# Patient Record
Sex: FEMALE | Race: WHITE | Employment: PART TIME | URBAN - METROPOLITAN AREA
[De-identification: names, ages, dates, MRNs, and addresses within clinical notes are randomized per-mention and may not be internally consistent; named-entity substitution may affect disease eponyms.]

---

## 2017-07-12 ENCOUNTER — HOSPITAL ENCOUNTER (OUTPATIENT)
Age: 37
Setting detail: OBSERVATION
Discharge: HOME | End: 2017-07-13
Attending: EMERGENCY MEDICINE | Admitting: HOSPITALIST
Payer: MEDICAID

## 2017-07-12 ENCOUNTER — APPOINTMENT (OUTPATIENT)
Dept: CT IMAGING | Age: 37
End: 2017-07-12
Attending: PHYSICIAN ASSISTANT
Payer: MEDICAID

## 2017-07-12 DIAGNOSIS — S09.90XA HEAD TRAUMA, INITIAL ENCOUNTER: Primary | ICD-10-CM

## 2017-07-12 DIAGNOSIS — G40.A09 NONINTRACTABLE ABSENCE EPILEPSY WITHOUT STATUS EPILEPTICUS (HCC): ICD-10-CM
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
## 2017-07-12 DIAGNOSIS — S06.2X9A: ICD-10-CM

## 2017-07-12 DIAGNOSIS — S00.81XA FACIAL ABRASION, INITIAL ENCOUNTER: ICD-10-CM

## 2017-07-12 DIAGNOSIS — S09.90XA CHI (CLOSED HEAD INJURY), INITIAL ENCOUNTER: ICD-10-CM

## 2017-07-12 LAB
ALBUMIN SERPL BCP-MCNC: 3.5 G/DL (ref 3.4–5)
ALBUMIN/GLOB SERPL: 1 {RATIO} (ref 0.8–1.7)
ALP SERPL-CCNC: 137 U/L (ref 45–117)
ALT SERPL-CCNC: 15 U/L (ref 13–56)
AMPHET UR QL SCN: NEGATIVE
ANION GAP BLD CALC-SCNC: 12 MMOL/L (ref 3–18)
APPEARANCE UR: CLEAR
APTT PPP: 37.5 SEC (ref 23–36.4)
AST SERPL W P-5'-P-CCNC: 22 U/L (ref 15–37)
BARBITURATES UR QL SCN: NEGATIVE
BASOPHILS # BLD AUTO: 0 K/UL (ref 0–0.1)
BASOPHILS # BLD: 0 % (ref 0–3)
BENZODIAZ UR QL: NEGATIVE
BILIRUB SERPL-MCNC: 0.4 MG/DL (ref 0.2–1)
BILIRUB UR QL: NEGATIVE
BUN SERPL-MCNC: 9 MG/DL (ref 7–18)
BUN/CREAT SERPL: 13 (ref 12–20)
CALCIUM SERPL-MCNC: 8.6 MG/DL (ref 8.5–10.1)
CANNABINOIDS UR QL SCN: NEGATIVE
CHLORIDE SERPL-SCNC: 106 MMOL/L (ref 100–108)
CK MB CFR SERPL CALC: NORMAL % (ref 0–4)
CK MB SERPL-MCNC: <1 NG/ML (ref 5–25)
CK SERPL-CCNC: 101 U/L (ref 26–192)
CO2 SERPL-SCNC: 22 MMOL/L (ref 21–32)
COCAINE UR QL SCN: NEGATIVE
COLOR UR: YELLOW
CREAT SERPL-MCNC: 0.7 MG/DL (ref 0.6–1.3)
D DIMER PPP FEU-MCNC: 0.31 UG/ML(FEU)
DIFFERENTIAL METHOD BLD: ABNORMAL
EOSINOPHIL # BLD: 0.2 K/UL (ref 0–0.4)
EOSINOPHIL NFR BLD: 2 % (ref 0–5)
ERYTHROCYTE [DISTWIDTH] IN BLOOD BY AUTOMATED COUNT: 13 % (ref 11.6–14.5)
GLOBULIN SER CALC-MCNC: 3.4 G/DL (ref 2–4)
GLUCOSE SERPL-MCNC: 123 MG/DL (ref 74–99)
GLUCOSE UR STRIP.AUTO-MCNC: NEGATIVE MG/DL
HCG UR QL: NEGATIVE
HCT VFR BLD AUTO: 38.5 % (ref 35–45)
HDSCOM,HDSCOM: NORMAL
HGB BLD-MCNC: 13.3 G/DL (ref 12–16)
HGB UR QL STRIP: NEGATIVE
INR PPP: 1 (ref 0.8–1.2)
KETONES UR QL STRIP.AUTO: NEGATIVE MG/DL
LEUKOCYTE ESTERASE UR QL STRIP.AUTO: NEGATIVE
LYMPHOCYTES # BLD AUTO: 32 % (ref 20–51)
LYMPHOCYTES # BLD: 3.6 K/UL (ref 0.8–3.5)
MCH RBC QN AUTO: 30.2 PG (ref 24–34)
MCHC RBC AUTO-ENTMCNC: 34.5 G/DL (ref 31–37)
MCV RBC AUTO: 87.3 FL (ref 74–97)
METHADONE UR QL: NEGATIVE
MONOCYTES # BLD: 1 K/UL (ref 0–1)
MONOCYTES NFR BLD AUTO: 9 % (ref 2–9)
NEUTS SEG # BLD: 6.3 K/UL (ref 1.8–8)
NEUTS SEG NFR BLD AUTO: 57 % (ref 42–75)
NITRITE UR QL STRIP.AUTO: NEGATIVE
OPIATES UR QL: NEGATIVE
PCP UR QL: NEGATIVE
PH UR STRIP: 6 [PH] (ref 5–8)
PLATELET # BLD AUTO: 244 K/UL (ref 135–420)
PLATELET COMMENTS,PCOM: ABNORMAL
PMV BLD AUTO: 12.6 FL (ref 9.2–11.8)
POTASSIUM SERPL-SCNC: 3.7 MMOL/L (ref 3.5–5.5)
PROT SERPL-MCNC: 6.9 G/DL (ref 6.4–8.2)
PROT UR STRIP-MCNC: NEGATIVE MG/DL
PROTHROMBIN TIME: 12.7 SEC (ref 11.5–15.2)
RBC # BLD AUTO: 4.41 M/UL (ref 4.2–5.3)
RBC MORPH BLD: ABNORMAL
SODIUM SERPL-SCNC: 140 MMOL/L (ref 136–145)
SP GR UR REFRACTOMETRY: 1.01 (ref 1–1.03)
TROPONIN I SERPL-MCNC: <0.02 NG/ML (ref 0–0.06)
UROBILINOGEN UR QL STRIP.AUTO: 0.2 EU/DL (ref 0.2–1)
WBC # BLD AUTO: 11.1 K/UL (ref 4.6–13.2)
WBC MORPH BLD: ABNORMAL

## 2017-07-12 PROCEDURE — 96360 HYDRATION IV INFUSION INIT: CPT

## 2017-07-12 PROCEDURE — 96361 HYDRATE IV INFUSION ADD-ON: CPT

## 2017-07-12 PROCEDURE — 81025 URINE PREGNANCY TEST: CPT

## 2017-07-12 PROCEDURE — 74011250636 HC RX REV CODE- 250/636: Performed by: PHYSICIAN ASSISTANT

## 2017-07-12 PROCEDURE — 99285 EMERGENCY DEPT VISIT HI MDM: CPT

## 2017-07-12 PROCEDURE — 70450 CT HEAD/BRAIN W/O DYE: CPT

## 2017-07-12 PROCEDURE — 93005 ELECTROCARDIOGRAM TRACING: CPT

## 2017-07-12 PROCEDURE — 99218 HC RM OBSERVATION: CPT

## 2017-07-12 PROCEDURE — 80307 DRUG TEST PRSMV CHEM ANLYZR: CPT | Performed by: PHYSICIAN ASSISTANT

## 2017-07-12 PROCEDURE — 74011250637 HC RX REV CODE- 250/637: Performed by: PHYSICIAN ASSISTANT

## 2017-07-12 PROCEDURE — 74011250637 HC RX REV CODE- 250/637: Performed by: HOSPITALIST

## 2017-07-12 PROCEDURE — 80053 COMPREHEN METABOLIC PANEL: CPT | Performed by: PHYSICIAN ASSISTANT

## 2017-07-12 PROCEDURE — 85025 COMPLETE CBC W/AUTO DIFF WBC: CPT | Performed by: FAMILY MEDICINE

## 2017-07-12 PROCEDURE — 80177 DRUG SCRN QUAN LEVETIRACETAM: CPT | Performed by: PHYSICIAN ASSISTANT

## 2017-07-12 PROCEDURE — 81003 URINALYSIS AUTO W/O SCOPE: CPT | Performed by: PHYSICIAN ASSISTANT

## 2017-07-12 PROCEDURE — 70486 CT MAXILLOFACIAL W/O DYE: CPT

## 2017-07-12 PROCEDURE — 85379 FIBRIN DEGRADATION QUANT: CPT | Performed by: PHYSICIAN ASSISTANT

## 2017-07-12 PROCEDURE — 85610 PROTHROMBIN TIME: CPT | Performed by: PHYSICIAN ASSISTANT

## 2017-07-12 PROCEDURE — 82550 ASSAY OF CK (CPK): CPT | Performed by: PHYSICIAN ASSISTANT

## 2017-07-12 PROCEDURE — 85730 THROMBOPLASTIN TIME PARTIAL: CPT | Performed by: PHYSICIAN ASSISTANT

## 2017-07-12 RX ORDER — ONDANSETRON 2 MG/ML
4 INJECTION INTRAMUSCULAR; INTRAVENOUS
Status: DISCONTINUED | OUTPATIENT
Start: 2017-07-12 | End: 2017-07-13 | Stop reason: HOSPADM

## 2017-07-12 RX ORDER — LEVETIRACETAM 500 MG/1
1500 TABLET ORAL
Status: COMPLETED | OUTPATIENT
Start: 2017-07-12 | End: 2017-07-12

## 2017-07-12 RX ORDER — ACETAMINOPHEN 325 MG/1
650 TABLET ORAL
Status: DISCONTINUED | OUTPATIENT
Start: 2017-07-12 | End: 2017-07-13 | Stop reason: HOSPADM

## 2017-07-12 RX ORDER — LEVETIRACETAM 250 MG/1
1000 TABLET ORAL 2 TIMES DAILY
Status: DISCONTINUED | OUTPATIENT
Start: 2017-07-13 | End: 2017-07-12

## 2017-07-12 RX ORDER — LEVETIRACETAM 500 MG/1
1500 TABLET ORAL 2 TIMES DAILY
Status: DISCONTINUED | OUTPATIENT
Start: 2017-07-13 | End: 2017-07-13 | Stop reason: HOSPADM

## 2017-07-12 RX ORDER — SODIUM CHLORIDE 9 MG/ML
75 INJECTION, SOLUTION INTRAVENOUS CONTINUOUS
Status: DISCONTINUED | OUTPATIENT
Start: 2017-07-12 | End: 2017-07-13

## 2017-07-12 RX ADMIN — ACETAMINOPHEN 650 MG: 325 TABLET ORAL at 22:05

## 2017-07-12 RX ADMIN — LEVETIRACETAM 1500 MG: 500 TABLET, FILM COATED ORAL at 21:29

## 2017-07-12 RX ADMIN — SODIUM CHLORIDE 1000 ML: 900 INJECTION, SOLUTION INTRAVENOUS at 19:00
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
## 2017-07-12 NOTE — ED NOTES
Report received. Care of pt assumed at this time. Pt resting in position of comfort. Awaiting CT scan. Family at bedside. Will continue to monitor. No further needs expressed at this time.

## 2017-07-12 NOTE — IP AVS SNAPSHOT
Joy Riley 
 
 
 [address] [place] [address] 
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 BMI OB Status Smoking Status 1.626 m 77.1 kg No 29.18 kg/m2 Having regular periods Former Smoker Unresulted Labs Order Current Status LEVETIRACETAM (KEPPRA) In process Emergency Contacts Name Discharge Info Relation Home Work Mobile Climmie Batters  Parent [1] [phone] About your hospitalization You were admitted on:  July 12, 2017 You last received care in the:  [address] [place] [address] You were discharged on:  July 13, 2017 Unit phone number:  [phone] Why you were hospitalized Your primary diagnosis was:  Acute Head Trauma Your diagnoses also included:  Absence Seizure Disorder (Hcc), Seizure Disorder (Hcc) Providers Seen During Your Hospitalizations Provider Role Specialty Primary office phone Chirag Valdes MD Attending Provider Emergency Medicine [phone] Samreen Fairchild MD Attending Provider Emergency Medicine [phone] Deana Andrade MD Attending Provider [facility] [phone] Jimena Martinez DO Attending Provider Internal Medicine [phone] Your Primary Care Physician (PCP) Primary Care Physician Office Phone Office Fax Skillman Marcus [phone] Follow-up Information Follow up With Details Comments Contact Info Quentin Mae MD   [address] 
[phone] Lisa Mccormick MD  or your neurologist C/ Anjel Mckeon [address] [place] [address] 
[phone] Ynes Jones MD   18[address] [facility] [address] 
 [address] [address] 
[phone] Current Discharge Medication List  
  
CONTINUE these medications which have CHANGED Dose & Instructions Dispensing Information Comments Morning Noon Evening Bedtime  
 levETIRAcetam 750 mg tablet Commonly known as:  KEPPRA What changed:   
- medication strength 
- how much to take Your last dose was: Your next dose is:    
   
   
 Dose:  1500 mg Take 2 Tabs by mouth two (2) times a day. Quantity:  180 Tab Refills:  1 CONTINUE these medications which have NOT CHANGED Dose & Instructions Dispensing Information Comments Morning Noon Evening Bedtime  
 cyanocobalamin 1,000 mcg tablet Your last dose was: Your next dose is:    
   
   
 Dose:  1000 mcg Take 1,000 mcg by mouth daily. Refills:  0  
     
   
   
   
  
 multivitamin tablet Commonly known as:  ONE A DAY Your last dose was: Your next dose is:    
   
   
 Dose:  1 Tab Take 1 tablet by mouth daily. Refills:  0 Where to Get Your Medications Information on where to get these meds will be given to you by the nurse or doctor. ! Ask your nurse or doctor about these medications  
  levETIRAcetam 750 mg tablet Discharge Instructions   
  
epilepsy precautions including no driving for six months!! Epilepsy: Care Instructions Your Care Instructions Epilepsy is a common condition that causes repeated seizures. The seizures are caused by bursts of electrical activity in the brain that aren't normal. Seizures may cause problems with muscle control, movement, speech, vision, or awareness. They can be scary. Epilepsy affects each person differently. Some people have only a few seizures. Others get them more often. If you know what triggers a seizure, you may be able to avoid having one. You can take medicines to control and reduce seizures. You and your doctor will need to find the right combination, schedule, and dose of medicine. This may take time and careful changes. Seizures may get worse and happen more often over time. Follow-up care is a key part of your treatment and safety. Be sure to make and go to all appointments, and call your doctor if you are having problems. It's also a good idea to know your test results and keep a list of the medicines you take. How can you care for yourself at home? · Be safe with medicines. Take your medicines exactly as prescribed. Call your doctor if you think you are having a problem with your medicine. · Make a treatment plan with your doctor. Be sure to follow your plan. · Try to identify and avoid things that may make you more likely to have a seizure. These may include: ¨ Not getting enough sleep. ¨ Using drugs or alcohol. ¨ Being emotionally stressed. ¨ Skipping meals. · Keep a record of any seizures you have. Note the date, time of day, and any details about the seizure that you can remember. Your doctor can use this information to plan or adjust your medicine or other treatment. · Be sure that any doctor treating you for another condition knows that you have epilepsy. Each doctor should know what medicines you are taking, if any. · Wear a medical ID bracelet. You can buy this at most drugstores. If you have a seizure that leaves you unconscious or unable to speak for yourself, this bracelet will let those who are treating you know that you have epilepsy. · Talk to your doctor about whether it is safe for you to do certain activities, such as drive or swim. When should you call for help? Call 911 anytime you think you may need emergency care. For example, call if: · A seizure does not stop as it normally does. · You have new symptoms such as: 
¨ Numbness, tingling, or weakness on one side of your body or face. ¨ Vision changes. ¨ Trouble speaking or thinking clearly. Call your doctor now or seek immediate medical care if: 
· You have a fever. · You have a severe headache. Watch closely for changes in your health, and be sure to contact your doctor if: · The normal pattern or features of your seizures change. Where can you learn more? Go to http://gaurang-shira.info/. [employer] in the search box to learn more about \"Epilepsy: Care Instructions. \" Current as of: October 14, 2016 Content Version: 11.3 © 3127-7358 [employer]. Care instructions adapted under license by [employer] (which disclaims liability or warranty for this information). If you have questions about a medical condition or this instruction, always ask your healthcare professional. [address] any warranty or liability for your use of this information. DISCHARGE SUMMARY from Nurse The following personal items are in your possession at time of discharge: 
 
Dental Appliances: None Visual Aid: Glasses, With patient Home Medications: None Jewelry: Ring, Earrings (in purse) Clothing: Shirt Other Valuables: None PATIENT INSTRUCTIONS: 
 
 
F-face looks uneven A-arms unable to move or move unevenly S-speech slurred or non-existent T-time-call 911 as soon as signs and symptoms begin-DO NOT go Back to bed or wait to see if you get better-TIME IS BRAIN. Warning Signs of HEART ATTACK Call 911 if you have these symptoms: 
? Chest discomfort. Most heart attacks involve discomfort in the center of the chest that lasts more than a few minutes, or that goes away and comes back. It can feel like uncomfortable pressure, squeezing, fullness, or pain. ? Discomfort in other areas of the upper body.  Symptoms can include pain or discomfort in one or both arms, the back, neck, jaw, or stomach. ? Shortness of breath with or without chest discomfort. ? Other signs may include breaking out in a cold sweat, nausea, or lightheadedness. Don't wait more than five minutes to call [address]! Fast action can save your life. Calling 911 is almost always the fastest way to get lifesaving treatment. Emergency Medical Services staff can begin treatment when they arrive  up to an hour sooner than if someone gets to the hospital by car. The discharge information has been reviewed with the patient. The patient verbalized understanding. Discharge medications reviewed with the patient and appropriate educational materials and side effects teaching were provided. Patient armband removed and shredded Discharge Orders None [employer] Announcement We are excited to announce that we are making your provider's discharge notes available to you in Oxtox. You will see these notes when they are completed and signed by the physician that discharged you from your recent hospital stay. If you have any questions or concerns about any information you see in Oxtox, please call the Health Information Department where you were seen or reach out to your Primary Care Provider for more information about your plan of care. Introducing \[facility]\"" & HEALTH SERVICES! [employer] introduces [employer]xtox patient portal. Now you can access parts of your medical record, email your doctor's office, and request medication refills online. 1. In your internet browser, go to [email or web address] 2. Click on the First Time User? Click Here link in the Sign In box. You will see the New Member Sign Up page. 3. Enter your Oxtox Access Code exactly as it appears below. You will not need to use this code after youve completed the sign-up process. If you do not sign up before the expiration date, you must request a new code. · [employer] Access Code: [email or web address] Expires: 10/11/2017  8:59 AM 
 
4. Enter the last four digits of your Social Security Number (xxxx) and Date of Birth (mm/dd/yyyy) as indicated and click Submit. You will be taken to the next sign-up page. 5. Create a [employer] ID. This will be your [employer] login ID and cannot be changed, so think of one that is secure and easy to remember. 6. Create a [employer] password. You can change your password at any time. 7. Enter your Password Reset Question and Answer. This can be used at a later time if you forget your password. 8. Enter your e-mail address. You will receive e-mail notification when new information is available in [address] 9. Click Sign Up. You can now view and download portions of your medical record. 10. Click the Download Summary menu link to download a portable copy of your medical information. If you have questions, please visit the Frequently Asked Questions section of the [employer] website. Remember, [employer] is NOT to be used for urgent needs. For medical emergencies, dial 911. Now available from your iPhone and Android! General Information Please provide this summary of care documentation to your next provider. Patient Signature:  ____________________________________________________________ Date:  ____________________________________________________________  
  
Ilia Barrios Provider Signature:  ____________________________________________________________ Date:  ____________________________________________________________

## 2017-07-12 NOTE — IP AVS SNAPSHOT
Summary of Care Report The Summary of Care report has been created to help improve care coordination. Users with access to [employer] or [address] (Web-based application) may access additional patient information including the Discharge Summary. If you are not currently a [address] user and need more information, please call the number listed below in the [place] [address] section and ask to be connected with Medical Records. Facility Information Name Address Phone [address] [place] [id number] [phone] Patient Information Patient Name Sex DANG Mathews ([id number]) Female 1980 Discharge Information Admitting Provider Service Area Unit Essence Tucker MD / [phone] [facility] 3 [address] [facility]/Med / [phone] Discharge Provider Discharge Date/Time Discharge Disposition Destination (none) 2017 Midday (Pending) AHR (none) Patient Language Language ENGLISH [13] Hospital Problems as of 2017  Reviewed: 2017  9:37 PM by Essence Tucker MD  
  
  
  
 Class Noted - Resolved Last Modified POA Active Problems Seizure disorder ([facility]  2011 - Present 2017 by Essence Tucker MD Yes Entered by Donna Lin Absence seizure disorder ([facility]  2017 - Present 2017 by Essence Tucker MD Yes Entered by Lisy Ignacio PA-C  
  * (Principal)Acute head trauma  2017 - Present 2017 by Essence Tucker MD Unknown Entered by Lisy Ignacio PA-C Non-Hospital Problems as of 2017  Reviewed: 2017  9:37 PM by Essence Tucker MD  
  
  
  
 Class Noted - Resolved Last Modified Active Problems Closed right ankle fracture  12/10/2014 - Present 2014 Entered by Amrit Horner PA-C You are allergic to the following Allergen Reactions Aspirin Nausea Only Had leukemia when she was 1 - was told by mother not to take aspirin Codeine Hives Current Discharge Medication List  
  
CONTINUE these medications which have CHANGED Dose & Instructions Dispensing Information Comments  
 levETIRAcetam 750 mg tablet Commonly known as:  KEPPRA What changed:   
- medication strength 
- how much to take Dose:  1500 mg Take 2 Tabs by mouth two (2) times a day. Quantity:  180 Tab Refills:  1 CONTINUE these medications which have NOT CHANGED Dose & Instructions Dispensing Information Comments  
 cyanocobalamin 1,000 mcg tablet Dose:  1000 mcg Take 1,000 mcg by mouth daily. Refills:  0  
   
 multivitamin tablet Commonly known as:  ONE A DAY Dose:  1 Tab Take 1 tablet by mouth daily. Refills:  0 Current Immunizations Name Date Influenza Vaccine 9/12/2014 Influenza Vaccine Split 11/30/2011 Tdap 5/3/2013 Follow-up Information Follow up With Details Comments Contact Info Jaquelin Echevarria MD   [address] 
[phone] Shayna Ivy MD  or your neurologist C/ Anjel Mckeon  [address] [facility] 
[phone] Carlos Chanel MD   14[address] [facility] [address] [facility] [id number] [phone] Discharge Instructions   
  
epilepsy precautions including no driving for six months!! Epilepsy: Care Instructions Your Care Instructions Epilepsy is a common condition that causes repeated seizures. The seizures are caused by bursts of electrical activity in the brain that aren't normal. Seizures may cause problems with muscle control, movement, speech, vision, or awareness. They can be scary. Epilepsy affects each person differently. Some people have only a few seizures. Others get them more often. If you know what triggers a seizure, you may be able to avoid having one. You can take medicines to control and reduce seizures. You and your doctor will need to find the right combination, schedule, and dose of medicine. This may take time and careful changes. Seizures may get worse and happen more often over time. Follow-up care is a key part of your treatment and safety. Be sure to make and go to all appointments, and call your doctor if you are having problems. It's also a good idea to know your test results and keep a list of the medicines you take. How can you care for yourself at home? · Be safe with medicines. Take your medicines exactly as prescribed. Call your doctor if you think you are having a problem with your medicine. · Make a treatment plan with your doctor. Be sure to follow your plan. · Try to identify and avoid things that may make you more likely to have a seizure. These may include: ¨ Not getting enough sleep. ¨ Using drugs or alcohol. ¨ Being emotionally stressed. ¨ Skipping meals. · Keep a record of any seizures you have. Note the date, time of day, and any details about the seizure that you can remember. Your doctor can use this information to plan or adjust your medicine or other treatment. · Be sure that any doctor treating you for another condition knows that you have epilepsy. Each doctor should know what medicines you are taking, if any. · Wear a medical ID bracelet. You can buy this at most drugstores. If you have a seizure that leaves you unconscious or unable to speak for yourself, this bracelet will let those who are treating you know that you have epilepsy. · Talk to your doctor about whether it is safe for you to do certain activities, such as drive or swim. When should you call for help? Call 911 anytime you think you may need emergency care. For example, call if: · A seizure does not stop as it normally does. · You have new symptoms such as: 
¨ Numbness, tingling, or weakness on one side of your body or face. ¨ Vision changes. ¨ Trouble speaking or thinking clearly. Call your doctor now or seek immediate medical care if: 
· You have a fever. · You have a severe headache. Watch closely for changes in your health, and be sure to contact your doctor if: · The normal pattern or features of your seizures change. Where can you learn more? Go to http://gaurang-shira.info/. Tiera Jensen in the search box to learn more about \"Epilepsy: Care Instructions. \" Current as of: October 14, 2016 Content Version: 11.3 © 5148-0389 [employer]. Care instructions adapted under license by [employer] (which disclaims liability or warranty for this information). If you have questions about a medical condition or this instruction, always ask your healthcare professional. [place] [address] any warranty or liability for your use of this information. DISCHARGE SUMMARY from Nurse The following personal items are in your possession at time of discharge: 
 
Dental Appliances: None Visual Aid: Glasses, With patient Home Medications: None Jewelry: Ring, Earrings (in purse) Clothing: Shirt Other Valuables: None PATIENT INSTRUCTIONS: 
 
 
F-face looks uneven A-arms unable to move or move unevenly S-speech slurred or non-existent T-time-call 911 as soon as signs and symptoms begin-DO NOT go Back to bed or wait to see if you get better-TIME IS BRAIN. Warning Signs of HEART ATTACK Call 911 if you have these symptoms: 
? Chest discomfort. Most heart attacks involve discomfort in the center of the chest that lasts more than a few minutes, or that goes away and comes back. It can feel like uncomfortable pressure, squeezing, fullness, or pain. ? Discomfort in other areas of the upper body.  Symptoms can include pain or discomfort in one or both arms, the back, neck, jaw, or stomach. ? Shortness of breath with or without chest discomfort. ? Other signs may include breaking out in a cold sweat, nausea, or lightheadedness. Don't wait more than five minutes to call [address]! Fast action can save your life. Calling 911 is almost always the fastest way to get lifesaving treatment. Emergency Medical Services staff can begin treatment when they arrive  up to an hour sooner than if someone gets to the hospital by car. The discharge information has been reviewed with the patient. The patient verbalized understanding. Discharge medications reviewed with the patient and appropriate educational materials and side effects teaching were provided. Patient armband removed and shredded Chart Review Routing History Recipient Method Report Sent By Joy Salmeron MD  
Phone: [phone] In Basket IP Auto Routed Benjamin Humphrey MD [[id number]] 12/12/2014  6:04 PM 12/12/2014

## 2017-07-13 ENCOUNTER — APPOINTMENT (OUTPATIENT)
Dept: MRI IMAGING | Age: 37
End: 2017-07-13
Attending: HOSPITALIST
Payer: MEDICAID

## 2017-07-13 VITALS
RESPIRATION RATE: 18 BRPM | HEIGHT: 64 IN | HEART RATE: 71 BPM | SYSTOLIC BLOOD PRESSURE: 113 MMHG | WEIGHT: 169.97 LBS | DIASTOLIC BLOOD PRESSURE: 79 MMHG | TEMPERATURE: 98.1 F | OXYGEN SATURATION: 98 % | BODY MASS INDEX: 29.02 KG/M2

## 2017-07-13 PROCEDURE — 74011250637 HC RX REV CODE- 250/637: Performed by: HOSPITALIST

## 2017-07-13 PROCEDURE — 70553 MRI BRAIN STEM W/O & W/DYE: CPT

## 2017-07-13 PROCEDURE — 99218 HC RM OBSERVATION: CPT

## 2017-07-13 PROCEDURE — 95816 EEG AWAKE AND DROWSY: CPT | Performed by: PHYSICIAN ASSISTANT

## 2017-07-13 PROCEDURE — A9585 GADOBUTROL INJECTION: HCPCS | Performed by: INTERNAL MEDICINE

## 2017-07-13 PROCEDURE — 74011250636 HC RX REV CODE- 250/636: Performed by: INTERNAL MEDICINE

## 2017-07-13 RX ORDER — LEVETIRACETAM 750 MG/1
1500 TABLET ORAL 2 TIMES DAILY
Qty: 180 TAB | Refills: 1 | Status: SHIPPED | OUTPATIENT
Start: 2017-07-13

## 2017-07-13 RX ORDER — LANOLIN ALCOHOL/MO/W.PET/CERES
1000 CREAM (GRAM) TOPICAL DAILY
Status: DISCONTINUED | OUTPATIENT
Start: 2017-07-13 | End: 2017-07-13 | Stop reason: HOSPADM

## 2017-07-13 RX ADMIN — MULTIPLE VITAMINS W/ MINERALS TAB 1 TABLET: TAB at 10:48

## 2017-07-13 RX ADMIN — CYANOCOBALAMIN TAB 500 MCG 1000 MCG: 500 TAB at 10:47

## 2017-07-13 RX ADMIN — GADOBUTROL 7.5 ML: 604.72 INJECTION INTRAVENOUS at 09:19

## 2017-07-13 RX ADMIN — LEVETIRACETAM 1500 MG: 500 TABLET, FILM COATED ORAL at 10:48

## 2017-07-13 NOTE — PROGRESS NOTES
Dual AVS reviewed with JOHN Hauser RN. All medications reviewed individually with patient. Opportunities for questions and concerns provided. Patient discharged via (mode of transport ie. Car, ambulance or air transport) car  Patient's arm band appropriately discarded.

## 2017-07-13 NOTE — PROGRESS NOTES
Chart review    Readmission Risk Assessment: Low Risk and MSSP/Good Help ACO patients    RRAT Score: 1 - 12    Initial Assessment:Christie Hodge is a 42 y.o. Female with Hx of absence seizures, remote hx of leukemia in [address] [place] [address], presenting to the ED via EMS S/P a syncopal episode/ seizure that occurred ~1 hour PTA patient admitted for acute head trauma    Emergency Contact: Ramirez Ivan [phone]    Pertinent Medical Hx:  Seizures, radiation    PCP/Specialists: [employer]:     DME:     Low Risk Care Transition Plan:  1. Evaluate for [facility] or [address] coordination of resources  2. Involve patient/caregiver in assessment, planning, education and implement of  3. Care Management Interventions  4. Transition of Care Consult (CM Consult): Discharge Planning intervention. 5. CM daily patient care huddles/interdisciplinary rounds. 6. PCP/Specialist appointment within 7 - 10 days made prior to discharge. 7. Facilitate transportation and logistics for follow-up appointments. 8. Handoff to [address] [place] [address] Ne or PCP practice    Care manager available and will assist with safe transition of care.

## 2017-07-13 NOTE — ED NOTES
Pt back from CT. ED tech at bedside collecting required lab tubes. Pt resting in position of comfort. Family remains at bedside. Will continue to monitor while awaiting results.
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
## 2017-07-13 NOTE — PROGRESS NOTES
conducted an initial consultation and Spiritual Assessment for Marlen Valdes, who is a 42 y.o.,female. Patients Primary Language is: [place]. According to the patients EMR Sikh Affiliation is: No Restoration. The reason the Patient came to the hospital is:   Patient Active Problem List    Diagnosis Date Noted    Absence seizure disorder ([facility] 07/12/2017    Acute head trauma 07/12/2017    Closed right ankle fracture 12/10/2014    Seizure disorder ([facility] 11/30/2011        The  provided the following Interventions:  Initiated a relationship of care and support. Explored issues of karyn, belief, spirituality and Jainism/ritual needs while hospitalized. Listened empathically. Provided chaplaincy education. Provided information about Spiritual Care Services. Offered prayer and assurance of continued prayers on patients behalf. Chart reviewed. The following outcomes were achieved:  Patient shared limited information about both their medical narrative and spiritual journey/beliefs. Patient processed feeling about current hospitalization. Patient expressed gratitude for pastoral care visit. Assessment:  Patient does not have any Jainism/cultural needs that will affect patients preferences in health care. There are no further spiritual or Jainism issues which require intervention at this time. Plan:  Chaplains will continue to follow and will provide pastoral care on an as needed/requested basis.  recommends bedside caregivers page  on duty if patient shows signs of acute spiritual or emotional distress.       Sister Nany [facility], [place], [address]  [phone]

## 2017-07-13 NOTE — PROGRESS NOTES
D/c plan home today    Patient lives with her father in a hotel. Patient reports she has PCP and has medicaid for medical purposes. States she gets her medications under the medicaid plan. Patient denies any needs with discharge planning at this time. Care Management Interventions  PCP Verified by CM: Yes  Transition of Care Consult (CM Consult): Discharge Planning  Current Support Network: Lives with Caregiver  Confirm Follow Up Transport: Family (father has made arrangements for her to be picked up)  Plan discussed with Pt/Family/Caregiver: Yes     Care manger available and will assist with safe transition of care.

## 2017-07-13 NOTE — PROCEDURES
ELECTROENCEPHALOGRAPHY     Patient: Alejo Veras MRN: [id number]  CSN: [id number]    YOB: 1980  Age: 42 y.o. Sex: female    DOA: 7/12/2017 LOS:  LOS: 0 days        Date of Service: 07/13/2017    DICTATING: Asmita Shah MD     REFERRING PHYSICIAN: GEOFF Woods    ELECTROENCEPHALOGRAM NUMBER: [id number]    HISTORY OF PRESENT ILLNESS: This is a 42year old female with prior CNS radiation and intracranial calcifications and seizure disorder, who presented with a seizure. This EEG was performed in order to assess electrodiagnostic risk factors for epilepsy. CURRENT MEDICATIONS: Levetiracetam    ELECTROENCEPHALOGRAM INTERPRETATION: This is a referential and bipolar EEG recorded with a 10-20 system. EEG background during wakefulness shows 12 hertz posterior dominant rhythm, which disappears with eye opening or activation procedures. There is continuous left frontotemporal 4 to 5 hertz theta activity intermixed with the background. Drowsiness is accompanied by loss of posterior dominant rhythm and generalized alpha and theta activities. A stage II sleep pattern is not seen. Hyperventilation does not produce an abnormal activity. Photic stimulation elicits some driving response at certain flash frequencies without any abnormal activity. There are frequent left frontotemporal (F7>T3) interictal epileptiform discharges during the study. There are no electrographic seizures in the study. IMPRESSION: This EEG is abnormal due to left frontotemporal slowing and interictal epileptiform discharges, which are indicators of focal cerebral dysfunction from any cause, increased risk factor for seizures and possible localizations for them. Clinical correlation is recommended.       Signed:  Asmita Shah MD  7/13/2017  2:33 PM
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
## 2017-07-13 NOTE — PROGRESS NOTES
Orders received, chart reviewed. Attempted PT assessment at this time. Per Dr. Brody pt will be getting d/c'd today. PT assessment will be attempted if pt is still on PT caseload and pt schedule allows.
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
## 2017-07-13 NOTE — PROGRESS NOTES
TRANSFER - IN REPORT:    Verbal report received from ALFONZO Rock R.N. on [place]  being received from Emergency Dept. for routine progression of care      Report consisted of patients Situation, Background, Assessment and   Recommendations(SBAR). Information from the following report(s) SBAR, Kardex, ED Summary, Intake/Output, MAR, Recent Results and Cardiac Rhythm NSR was reviewed with the receiving nurse. Opportunity for questions and clarification was provided. Assessment completed upon patients arrival to unit and care assumed. 2245: Assumed patient care, She was alert and oriented to person, place, time and situation. Respiratory status was stable on room air. Vital signs were stable. MEWS score was a one. Patient denied any nausea vomiting dizziness or anxiety. White board was updated and explained. Bed was locked and in lowest position. Call bell, water and personal belongings were within reach. Patient had no questions, comments or concerns after bedside shift report. 2300: Neuro check was negative for any changes in status from baseline. Patient was alert and orientated to person, place, time and situation. Pupils were PERRLA. Face was symmetrical. Tongue was midline. Speech was clear and appropriate for age. Eye focused and tract accordingly. Grasp was strong and equal. Patient had no concerns or complaints. 0300: Neuro check was negative for any changes in status. Patient was alert and orientated to person, place, time and situation. Pupils were PERRLA. Face was symmetrical. Tongue was midline. Speech was clear and appropriate for age. Eye focused and tract accordingly. Grasp was strong and equal. Patient had no concerns or complaints.

## 2017-07-13 NOTE — DISCHARGE INSTRUCTIONS
epilepsy precautions including no driving for six months!! Epilepsy: Care Instructions  Your Care Instructions  Epilepsy is a common condition that causes repeated seizures. The seizures are caused by bursts of electrical activity in the brain that aren't normal. Seizures may cause problems with muscle control, movement, speech, vision, or awareness. They can be scary. Epilepsy affects each person differently. Some people have only a few seizures. Others get them more often. If you know what triggers a seizure, you may be able to avoid having one. You can take medicines to control and reduce seizures. You and your doctor will need to find the right combination, schedule, and dose of medicine. This may take time and careful changes. Seizures may get worse and happen more often over time. Follow-up care is a key part of your treatment and safety. Be sure to make and go to all appointments, and call your doctor if you are having problems. It's also a good idea to know your test results and keep a list of the medicines you take. How can you care for yourself at home? · Be safe with medicines. Take your medicines exactly as prescribed. Call your doctor if you think you are having a problem with your medicine. · Make a treatment plan with your doctor. Be sure to follow your plan. · Try to identify and avoid things that may make you more likely to have a seizure. These may include:  ¨ Not getting enough sleep. ¨ Using drugs or alcohol. ¨ Being emotionally stressed. ¨ Skipping meals. · Keep a record of any seizures you have. Note the date, time of day, and any details about the seizure that you can remember. Your doctor can use this information to plan or adjust your medicine or other treatment. · Be sure that any doctor treating you for another condition knows that you have epilepsy. Each doctor should know what medicines you are taking, if any. · Wear a medical ID bracelet.  You can buy this at most drugstores. If you have a seizure that leaves you unconscious or unable to speak for yourself, this bracelet will let those who are treating you know that you have epilepsy. · Talk to your doctor about whether it is safe for you to do certain activities, such as drive or swim. When should you call for help? Call 911 anytime you think you may need emergency care. For example, call if:  · A seizure does not stop as it normally does. · You have new symptoms such as:  ¨ Numbness, tingling, or weakness on one side of your body or face. ¨ Vision changes. ¨ Trouble speaking or thinking clearly. Call your doctor now or seek immediate medical care if:  · You have a fever. · You have a severe headache. Watch closely for changes in your health, and be sure to contact your doctor if:  · The normal pattern or features of your seizures change. Where can you learn more? Go to http://gaurangzandashira.info/. Angelita Birmingham in the search box to learn more about \"Epilepsy: Care Instructions. \"  Current as of: October 14, 2016  Content Version: 11.3  © 4314-0160 [employer]. Care instructions adapted under license by [employer] (which disclaims liability or warranty for this information). If you have questions about a medical condition or this instruction, always ask your healthcare professional. [place] [address] any warranty or liability for your use of this information.   DISCHARGE SUMMARY from Nurse    The following personal items are in your possession at time of discharge:    Dental Appliances: None  Visual Aid: Glasses, With patient     Home Medications: None  Jewelry: Ring, Earrings (in purse)  Clothing: Shirt  Other Valuables: None             PATIENT INSTRUCTIONS:    After general anesthesia or intravenous sedation, for 24 hours or while taking prescription Narcotics:  · Limit your activities  · Do not drive and operate hazardous machinery  · Do not make important personal or business decisions  · Do  not drink alcoholic beverages  · If you have not urinated within 8 hours after discharge, please contact your surgeon on call. Report the following to your surgeon:  · Excessive pain, swelling, redness or odor of or around the surgical area  · Temperature over 100.5  · Nausea and vomiting lasting longer than 4 hours or if unable to take medications  · Any signs of decreased circulation or nerve impairment to extremity: change in color, persistent  numbness, tingling, coldness or increase pain  · Any questions        What to do at Home:  Recommended activity: Activity as tolerated          *  Please give a list of your current medications to your Primary Care Provider. *  Please update this list whenever your medications are discontinued, doses are      changed, or new medications (including over-the-counter products) are added. *  Please carry medication information at all times in case of emergency situations. These are general instructions for a healthy lifestyle:    No smoking/ No tobacco products/ Avoid exposure to second hand smoke    Surgeon General's Warning:  Quitting smoking now greatly reduces serious risk to your health. Obesity, smoking, and sedentary lifestyle greatly increases your risk for illness    A healthy diet, regular physical exercise & weight monitoring are important for maintaining a healthy lifestyle    You may be retaining fluid if you have a history of heart failure or if you experience any of the following symptoms:  Weight gain of 3 pounds or more overnight or 5 pounds in a week, increased swelling in our hands or feet or shortness of breath while lying flat in bed. Please call your doctor as soon as you notice any of these symptoms; do not wait until your next office visit.     Recognize signs and symptoms of STROKE:    F-face looks uneven    A-arms unable to move or move unevenly    S-speech slurred or non-existent    T-time-call 911 as soon as signs and symptoms begin-DO NOT go       Back to bed or wait to see if you get better-TIME IS BRAIN. Warning Signs of HEART ATTACK     Call 911 if you have these symptoms:   Chest discomfort. Most heart attacks involve discomfort in the center of the chest that lasts more than a few minutes, or that goes away and comes back. It can feel like uncomfortable pressure, squeezing, fullness, or pain.  Discomfort in other areas of the upper body. Symptoms can include pain or discomfort in one or both arms, the back, neck, jaw, or stomach.  Shortness of breath with or without chest discomfort.  Other signs may include breaking out in a cold sweat, nausea, or lightheadedness. Don't wait more than five minutes to call 911 - MINUTES MATTER! Fast action can save your life. Calling 911 is almost always the fastest way to get lifesaving treatment. Emergency Medical Services staff can begin treatment when they arrive -- up to an hour sooner than if someone gets to the hospital by car. The discharge information has been reviewed with the patient. The patient verbalized understanding. Discharge medications reviewed with the patient and appropriate educational materials and side effects teaching were provided.     Patient armband removed and shredded

## 2017-07-13 NOTE — ED NOTES
TRANSFER - OUT REPORT:    Verbal report given to Sari Weiss RN(name) on Scout Wetzel  being transferred to telemetry(unit) for routine progression of care       Report consisted of patients Situation, Background, Assessment and   Recommendations(SBAR). Information from the following report(s) SBAR, ED Summary, Intake/Output, MAR, Recent Results and Cardiac Rhythm NSR was reviewed with the receiving nurse. Lines:   Peripheral IV 07/12/17 Left Hand (Active)   Site Assessment Clean, dry, & intact 7/12/2017  6:20 PM   Phlebitis Assessment 0 7/12/2017  6:20 PM   Infiltration Assessment 0 7/12/2017  6:20 PM   Dressing Status Clean, dry, & intact 7/12/2017  6:20 PM   Dressing Type Transparent 7/12/2017  6:20 PM   Hub Color/Line Status Pink 7/12/2017  6:20 PM        Opportunity for questions and clarification was provided.       Patient transported with:   Monitor  Registered Nurse  Tech

## 2017-07-13 NOTE — PROGRESS NOTES
2300-Opened patient's chart due to being told by supervisor being transferred from ED to ICU. Transferred to Tele. Out of chart.

## 2017-07-13 NOTE — PROGRESS NOTES
Neurology is aware. OK to admit pt for observation. EEG in AM. Increased Levetiracetam to 1500mg BID.     UY
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
## 2017-07-16 LAB — LEVETIRACETAM SERPL-MCNC: 27.6 UG/ML (ref 10–40)

## 2017-07-17 LAB
ATRIAL RATE: 85 BPM
CALCULATED P AXIS, ECG09: 39 DEGREES
CALCULATED R AXIS, ECG10: 86 DEGREES
CALCULATED T AXIS, ECG11: 20 DEGREES
DIAGNOSIS, 93000: NORMAL
P-R INTERVAL, ECG05: 146 MS
Q-T INTERVAL, ECG07: 356 MS
QRS DURATION, ECG06: 90 MS
QTC CALCULATION (BEZET), ECG08: 423 MS
VENTRICULAR RATE, ECG03: 85 BPM

## 2018-10-31 ENCOUNTER — HOSPITAL ENCOUNTER (OUTPATIENT)
Dept: PHYSICAL THERAPY | Age: 38
Discharge: HOME | End: 2018-10-31
Payer: MEDICAID

## 2018-10-31 PROCEDURE — 97165 OT EVAL LOW COMPLEX 30 MIN: CPT

## 2018-10-31 NOTE — PROGRESS NOTES
[address]  [facility] PHYSICAL THERAPY 
[address], [place], [address] - Phone: [phone]  Fax: [phone] PLAN OF CARE / STATEMENT OF MEDICAL NECESSITY FOR OCCUPATIONAL THERAPY SERVICES Patient Name: Dory Macias : 1980 Medical  
Diagnosis: Left hand pain [M79.642] Treatment Diagnosis: Left MF fracture Onset Date: 18 Referral Source: Stefan Henry MD [facility] [place]): 10/31/2018 Prior Hospitalization: See medical history Provider #: [id number] Prior Level of Function: Ind  
Comorbidities: na  
Medications: Verified on Patient Summary List  
The Plan of Care and following information is based on the information from the initial evaluation.  
=========================================================================================== Assessment / key information: Patient is a right handed 35 yo female s/p fall with left MF IP sprain and middle phalanx fracture. Recent x ray shows healed fracture. AROM left arm is normal except for middle finger: MP 0-80; PIP 0-80. Left  12# with pain. Right 33#. Sensation intact. Pain 4/10 MF. No hand edema. She is Ind with ADLs and light IADLs, except for strength and  tasks.  
=========================================================================================== Eval Complexity: History: LOW Complexity : Brief history review ; Examination: MEDIUM Complexity : 3-5 performance deficits relating to physical, cognitive , or psychosocial skils that result in activity limitations and / or participation restrictions; Decision Making:LOW Complexity : No comorbidities that affect functional and no verbal or physical assistance needed to complete eval tasks Problem List: Pain effecting function, Decreased range of motion, Decreased strength, Decreased ADL/functional abilities  and Decreased flexibility/joint mobility Treatment Plan may include any combination of the following: Therapeutic exercise, Therapeutic activities, Physical agent/modality, Manual therapy, Patient education and ADLs/IADLs Patient / Family readiness to learn indicated by: asking questions, trying to perform skills and interest 
Persons(s) to be included in education: patient (P) Barriers to Learning/Limitations: None Measures taken:   
Patient Goal (s): Less pain Patient self reported health status: fair Rehabilitation Potential: excellent ? Short Term Goals: To be accomplished in 4 weeks: 1. Ind HEP to maximize rehab potential  
2. Full active fist to hold coins 3. Ind washing dishes 4. Ind cooking- placing dishes in oven 5. Decrease pain 1/10 with ADLs 6. Increase left  5-10# for IADLs Frequency / Duration:   Patient to be seen 1-2  times per week for 4  weeks: 
Patient / Caregiver education and instruction: exercises G-Codes (GO): na Therapist Signature: WILIAM Conklin Date: 10/31/2018 Certification Period: na Time: 12:07 PM  
=========================================================================================== I certify that the above Occupational Therapy Services are being furnished while the patient is under my care. I agree with the treatment plan and certify that this therapy is necessary. Physician Signature:       Date:      Time:  Please sign and return to [facility] or you may fax the signed copy to [phone]. Thank you.

## 2018-10-31 NOTE — PROGRESS NOTES
OCCUPATIONAL THERAPY - DAILY TREATMENT NOTE Patient Name: Dory Macias        Date: 10/31/2018 : 1980   YES Patient  Verified Visit #:     Insurance: Payor: La Falk / Plan: [id number] / Product Type: Managed Care Medicaid / In time: 10:15 Out time: 10:50 Total Treatment Time: 35 TREATMENT AREA =  Left MF 
SUBJECTIVE Pain Level (on 0 to 10 scale):   10 Medication Changes/New allergies or changes in medical history, any new surgeries or procedures? NO    If yes, update Summary List  
Subjective Functional Status/Changes:  []  No changes reported The doctor told me not to wear the splint any more. OBJECTIVE 
 min Therapeutic Exercise:  [x]  See flow sheet  
 min Patient Education:  YES  Reviewed HEP- tendon glides  
[]  Progressed/Changed HEP based on: Other Objective/Functional Measures: 
 
See initial evaluation for details Post Treatment Pain Level (on 0 to 10) scale:   4  / 10 ASSESSMENT Assessment/Changes in Function:  
 
Patient presents with pain and stiffness left MF affecting functional use left hand and should benefit from skilled OT to address needs. OT Eval Complexity Justification: 
Patient History: Low-fall with fracture Examination : Mod-AROM;strength; pain and ADLs Clinical Decision Making: Low-motivated to return to baseline [x]  See Progress Note/Recertification Patient will continue to benefit from skilled OT services to address ROM deficits and address strength deficits to attain remaining goals. Progress toward goals / Updated goals: 
See eval goals PLAN [x]  Upgrade activities as tolerated YES Continue plan of care  
[]  Discharge due to :   
[x]  Other: OT 1-2 x week x 4 weeks for goals Therapist: WILIAM Parnell Date: 10/31/2018 Time: 12:07 PM

## 2018-11-06 ENCOUNTER — HOSPITAL ENCOUNTER (OUTPATIENT)
Dept: PHYSICAL THERAPY | Age: 38
Discharge: HOME | End: 2018-11-06
Payer: MEDICAID

## 2018-11-06 PROCEDURE — 97530 THERAPEUTIC ACTIVITIES: CPT

## 2018-11-06 NOTE — PROGRESS NOTES
OT DAILY TREATMENT NOTE  3-16 Patient Name: Arias Bullard Date:2018 : 1980 [x]  Patient  Verified Payor: Tyler Rodgers / Plan: Tier 3 / Product Type: Managed Care Medicaid / In time:11:00   Out time:11:31 Total Treatment Time (min): 31 Visit #: 2 of 8 Treatment Area: Pain in left hand [M79.642] SUBJECTIVE Pain Level (0-10 scale): 5/10 Any medication changes, allergies to medications, adverse drug reactions, diagnosis change, or new procedure performed?: [x] No    [] Yes (see summary sheet for update) Subjective functional status/changes:   [x] No changes reported \" My pinky hurts worse than my middle finger today\" OBJECTIVE Modality rationale: decrease pain and increase tissue extensibility to improve the patients ability to participate in therapy. Type Additional Details  
[] Estim:  []Unatt       []IFC  []Premod []Other:  []w/ice   []w/heat Position: Location:  
[] Estim: []Att    []TENS instruct  []NMES []Other:  []w/US   []w/ice   []w/heat Position: Location:  
[]  Traction: [] Cervical       []Lumbar 
                     [] Prone          []Supine []Intermittent   []Continuous Lbs: 
[] before manual 
[] after manual  
[]  Ultrasound: []Continuous   [] Pulsed []1MHz   []3MHz W/cm2: 
Location:  
[]  Iontophoresis with dexamethasone Location: [] Take home patch  
[] In clinic  
[]  Ice     [x]  Heat: Paraffin-10 min 
[]  Ice massage 
[]  Laser  
[]  Anodyne Position:Seated Location: L hand/MF []  Laser with stim 
[]  Other:  Position: Location:  
[]  Vasopneumatic Device Pressure:       [] lo [] med [] hi  
Temperature: [] lo [] med [] hi  
[x] Skin assessment post-treatment:  []intact []redness- no adverse reaction 
  []redness  adverse reaction:  
 
31 min Therapeutic Activity:  [x]  See flow sheet :  
 Rationale: increase ROM, increase strength and improve coordination  to improve the patients ability to Perform ADL's with greater ease and less pain. With 
 [x] TE 
 [] TA 
 [] neuro 
 [] other: Patient Education: [x] Review HEP [] Progressed/Changed HEP based on:  
[] positioning   [] body mechanics   [] transfers   [x] heat/ice application  
[] Splint wear/care   [] Sensory re-education   [] scar management     
[] other: Tendon glides Pain Level (0-10 scale) post treatment: 2/10 ASSESSMENT/Changes in Function:  
Patient reporting pain in L middle finger and pinky with ADL's today and pain while sleeping at night. Pt reports that she is performing HEP daily. Pain with tendon glides. Reports her father had to hep her carry in groceries over the weekend 2/2 her only being able to carry light items- bread, non canned food. Educated pt on contrast bath or heat pack at home to relieve pain. Educated pt on performing PROM on self into gross fist. [place] manipulation 1 handful with minimal spillage. Pt stated that her  is broken so she trying to hand wash with assistance from her son. Patient will continue to benefit from skilled OT services to modify and progress therapeutic interventions, address ROM deficits, address strength deficits, analyze and address soft tissue restrictions and instruct in home and community integration to attain remaining goals. [x]  See Plan of Care 
[]  See progress note/recertification 
[]  See Discharge Summary Progress towards goals / Updated goals: · Short Term Goals: To be accomplished in 4 weeks: 1. Ind HEP to maximize rehab potential  
 
2. Full active fist to hold coins Current: coin manipulation activity 3. Ind washing dishes 4. Ind cooking- placing dishes in oven 5. Decrease pain 1/10 with ADLs 6. Increase left  5-10# for IADLs PLAN 
[]  Upgrade activities as tolerated     [x]  Continue plan of care []  Update interventions per flow sheet      
[]  Discharge due to:_ 
[]  Other:_   
 
Deedee Arevalo,KING/L  11/6/2018  10:57 AM 
 
Future Appointments Date Time Provider Janusz Yen 11/6/2018 11:00 AM [address] [facility] [address]  
11/8/2018 11:00 AM [address] [facility] [address]  
11/13/2018 10:00 AM [address] [facility] [address]  
11/15/2018 10:00 AM [address] [facility] [address]  
11/20/2018 10:00 AM [address] [facility] [address]  
11/27/2018  1:00 PM [address] [facility] [address]  
11/29/2018  2:00 PM [address] [facility]  [place] [facility] [address]

## 2018-11-08 ENCOUNTER — APPOINTMENT (OUTPATIENT)
Dept: PHYSICAL THERAPY | Age: 38
End: 2018-11-08
Payer: MEDICAID

## 2018-11-13 ENCOUNTER — HOSPITAL ENCOUNTER (OUTPATIENT)
Dept: PHYSICAL THERAPY | Age: 38
Discharge: HOME | End: 2018-11-13
Payer: MEDICAID

## 2018-11-13 PROCEDURE — 97110 THERAPEUTIC EXERCISES: CPT

## 2018-11-13 PROCEDURE — 97530 THERAPEUTIC ACTIVITIES: CPT

## 2018-11-13 NOTE — PROGRESS NOTES
OT DAILY TREATMENT NOTE  3-16 Patient Name: Joy Madison Date:2018 : 1980 [x]  Patient  Verified Payor: Gee Anthony / Plan: [employer] / Product Type: Managed Care Medicaid / In time:10:04  Out time:10:45 Total Treatment Time (min): 41 Visit #: 3 of 8 Treatment Area: Pain in left hand [M79.642] SUBJECTIVE Pain Level (0-10 scale): 610 Any medication changes, allergies to medications, adverse drug reactions, diagnosis change, or new procedure performed?: [x] No    [] Yes (see summary sheet for update) Subjective functional status/changes:   [x] No changes reported \"I got a sharp pain in my middle finger earlier\" OBJECTIVE Modality rationale: decrease pain and increase tissue extensibility to improve the patients ability to participate in therapy Type Additional Details  
[] Estim:  []Unatt       []IFC  []Premod []Other:  []w/ice   []w/heat Position: Location:  
[] Estim: []Att    []TENS instruct  []NMES []Other:  []w/US   []w/ice   []w/heat Position: Location:  
[]  Traction: [] Cervical       []Lumbar 
                     [] Prone          []Supine []Intermittent   []Continuous Lbs: 
[] before manual 
[] after manual  
[]  Ultrasound: []Continuous   [] Pulsed []1MHz   []3MHz W/cm2: 
Location:  
[]  Iontophoresis with dexamethasone Location: [] Take home patch  
[] In clinic  
[]  Ice     [x]  Heat: Paraffin 
[]  Ice massage 
[]  Laser  
[]  Anodyne Position:Seated Location: L hand  
[]  Laser with stim 
[]  Other:  Position: Location:  
[]  Vasopneumatic Device Pressure:       [] lo [] med [] hi  
Temperature: [] lo [] med [] hi  
[x] Skin assessment post-treatment:  [x]intact [x]redness- no adverse reaction 
  []redness  adverse reaction:  
  
23 min Therapeutic Exercise:  [x] See flow sheet :  
 Rationale: increase ROM and increase strength to improve the patients ability to Perform ADL's with greater ease and less pain. 18 min Therapeutic Activity:  [x]  See flow sheet :  
Rationale: increase ROM, increase strength and improve coordination  to improve the patients ability to Perform ADL's with greater ease and less pain. With 
 [x] TE 
 [] TA 
 [] neuro 
 [] other: Patient Education: [x] Review HEP [] Progressed/Changed HEP based on:  
[] positioning   [] body mechanics   [] transfers   [x] heat/ice application  
[] Splint wear/care   [] Sensory re-education   [] scar management     
[x] other: Tendon glides, coin manipulation activity Pain Level (0-10 scale) post treatment: 4/10 ASSESSMENT/Changes in Function:  
Patient reporting that she would like to purchase a paraffin machine for home to ease pain. Therapist educated pt with resources on where to buy paraffin bath machine. Pain with tendon glides around knuckles but pt reports it is tolerable. Yellow thera-putty x 10 reps for  with mod Difficulty. [place] manipulation with minimal spillage and minimal pain in pinky with active fist to hold coins in hand. Pt reports having difficulty with placing items in oven and mixing ingredients in bowl. Added coin manipulation with tendon glides for HEP. Patient will continue to benefit from skilled OT services to modify and progress therapeutic interventions, address ROM deficits, address strength deficits, analyze and address soft tissue restrictions and instruct in home and community integration to attain remaining goals. [x]  See Plan of Care 
[]  See progress note/recertification 
[]  See Discharge Summary Progress towards goals / Updated goals: · Short Term Goals: To be accomplished in 4 weeks: 1. Ind HEP to maximize rehab potential  
Current: Reports completing HEP daily 
  
2. Full active fist to hold coins Current: [place] manipulation activity 
  
 3.Ind washing dishes 
  4. Ind cooking- placing dishes in oven 
  5. Decrease pain 1/10 with ADLs 
  6. Increase left  5-10# for IADLs PLAN 
[]  Upgrade activities as tolerated     [x]  Continue plan of care 
[]  Update interventions per flow sheet      
[]  Discharge due to:_ 
[]  Other:_   
 
Luis Arevalo,KING/L  11/13/2018  10:04 AM 
 
Future Appointments Date Time Provider Janusz Yen 11/15/2018 10:00 AM [facility] AT [facility]  
11/20/2018 10:00 AM [facility] AT [facility]  
11/27/2018  1:00 PM [facility] AT [facility]  
11/29/2018  2:00 PM [facility] OT AT [address] [place] [address] [facility]

## 2018-11-15 ENCOUNTER — APPOINTMENT (OUTPATIENT)
Dept: PHYSICAL THERAPY | Age: 38
End: 2018-11-15
Payer: MEDICAID

## 2018-11-20 ENCOUNTER — HOSPITAL ENCOUNTER (OUTPATIENT)
Dept: PHYSICAL THERAPY | Age: 38
Discharge: HOME | End: 2018-11-20
Payer: MEDICAID

## 2018-11-20 PROCEDURE — 97530 THERAPEUTIC ACTIVITIES: CPT

## 2018-11-20 NOTE — PROGRESS NOTES
OT DAILY TREATMENT NOTE  3-16 Patient Name: Tomy Carlos Date:2018 : 1980 [x]  Patient  Verified Payor: Lucio Del Castillo / Plan: [employer] / Product Type: Managed Care Medicaid / In time: 9:55  Out time:10:38 Total Treatment Time (min): 43 Visit #: 4 of 8 Treatment Area: Pain in left hand [M79.642] SUBJECTIVE Pain Level (0-10 scale): 4/10 Any medication changes, allergies to medications, adverse drug reactions, diagnosis change, or new procedure performed?: [x] No    [] Yes (see summary sheet for update) Subjective functional status/changes:   [x] No changes reported \"The shower helps my pain\" OBJECTIVE Modality rationale: decrease pain and increase tissue extensibility to improve the patients ability to participate in therapy Type Additional Details  
[] Estim:  []Unatt       []IFC  []Premod []Other:  []w/ice   []w/heat Position: Location:  
[] Estim: []Att    []TENS instruct  []NMES []Other:  []w/US   []w/ice   []w/heat Position: Location:  
[]  Traction: [] Cervical       []Lumbar 
                     [] Prone          []Supine []Intermittent   []Continuous Lbs: 
[] before manual 
[] after manual  
[]  Ultrasound: []Continuous   [] Pulsed []1MHz   []3MHz W/cm2: 
Location:  
[]  Iontophoresis with dexamethasone Location: [] Take home patch  
[] In clinic  
[]  Ice     [x]  Heat: Paraffin 
[]  Ice massage 
[]  Laser  
[]  Anodyne Position: Seated Location: L hand  
[]  Laser with stim 
[]  Other:  Position: Location:  
[]  Vasopneumatic Device Pressure:       [] lo [] med [] hi  
Temperature: [] lo [] med [] hi  
[x] Skin assessment post-treatment:  [x]intact [x]redness- no adverse reaction 
  []redness  adverse reaction:  
 
43 min Therapeutic Activity:  [x]  See flow sheet :  
Rationale: increase ROM, increase strength and improve coordination  to improve the patients ability to Perform ADL's with greater ease and less pain. With 
 [] TE 
 [x] TA 
 [] neuro 
 [] other: Patient Education: [x] Review HEP [] Progressed/Changed HEP based on:  
[] positioning   [] body mechanics   [] transfers   [x] heat/ice application  
[] Splint wear/care   [] Sensory re-education   [] scar management     
[] other: Yellow putty to increase  strength Pain Level (0-10 scale) post treatment: 3/10 ASSESSMENT/Changes in Function:  
Pt reports she normally has pain in the evening loading or unloading the , as well as with cooking (stabilize bowl with R hand and mix with L). Reports doing coin manipulation at home and states that it is getting easier. Introduced yellow putty for  and pt picked up the putty and put it in her mouth, and bit it. Therapist instructed patient to not put the putty in her mouth to just use for her hands even though it is non toxic. Pt states that she did not bite it, but therapist can see teeth marks in putty; x15 reps for  with yellow putty. Added yellow digi-flex x10 reps with Min difficulty and pain with L pinky. Bead sorting by color, x3 handfuls and  one at a time with thumb and middle finger. Patient will continue to benefit from skilled OT services to modify and progress therapeutic interventions, address ROM deficits, address strength deficits and instruct in home and community integration to attain remaining goals. [x]  See Plan of Care 
[]  See progress note/recertification 
[]  See Discharge Summary Progress towards goals / Updated goals: 1. Ind HEP to maximize rehab potential  
Current: Reports completing HEP daily 
  
2. Full active fist to hold coins Current: [place] manipulation activity 
  3. Ind washing dishes 
  4. Ind cooking- placing dishes in oven 
  5. Decrease pain 1/10 with ADLs 
  6. Increase left  5-10# for IADLs 
  
PLAN 
 [x]  Upgrade activities as tolerated     [x]  Continue plan of care 
[]  Update interventions per flow sheet      
[]  Discharge due to:_ 
[]  Other:_   
 
Trenda Read Mickey,CHRISTINE/L  11/20/2018  10:04 AM 
 
Future Appointments Date Time Provider Janusz Yen 11/27/2018  1:00 PM [facility] OT AT [facility]  
11/29/2018  2:00 PM [facility] OT AT [address] [place] [address] [facility]

## 2018-11-27 ENCOUNTER — HOSPITAL ENCOUNTER (OUTPATIENT)
Dept: PHYSICAL THERAPY | Age: 38
Discharge: HOME | End: 2018-11-27
Payer: MEDICAID

## 2018-11-27 PROCEDURE — 97110 THERAPEUTIC EXERCISES: CPT

## 2018-11-27 NOTE — PROGRESS NOTES
OT DAILY TREATMENT NOTE  3-16 Patient Name: Auburn Gaucher Date:2018 : 1980 [x]  Patient  Verified Payor: Lowell Barlow / Plan: [employer] / Product Type: Managed Care Medicaid / In time: 1:02 Out time:1:31 Total Treatment Time (min): 29 Visit #: 5 of 8 Treatment Area: Pain in left hand [M79.642] SUBJECTIVE Pain Level (0-10 scale):  310 Any medication changes, allergies to medications, adverse drug reactions, diagnosis change, or new procedure performed?: [x] No    [] Yes (see summary sheet for update) Subjective functional status/changes:   [x] No changes reported \" I almost dropped my casserole dish\" OBJECTIVE 29 min Therapeutic Exercise:  [] See flow sheet :  
Rationale: increase ROM, increase strength and improve coordination to improve the patients ability to Perform ADL's with greater ease and less pain. With 
 [] TE 
 [] TA 
 [] neuro 
 [] other: Patient Education: [x] Review HEP [] Progressed/Changed HEP based on:  
[] positioning   [] body mechanics   [] transfers   [] heat/ice application  
[] Splint wear/care   [] Sensory re-education   [] scar management     
[] other:   
 
      
Other Objective/Functional Measures: FOTO score: 56 Left  strength: Increased to 29# Pain Level (0-10 scale) post treatment: /10 ASSESSMENT/Changes in Function:  
Patient reports that she cancelled her f/u appt with doctor because her finger is not hurting like before and did not want a steroid injection. Pt reports that she has been performing yellow putty gross  x 20 reps, 1 x per day at home. Increased yellow to red digi-flex x 10 reps. Yellow to Blue graded clothespins on/off basket with Min pain using Blue clothespin; x 2 reps. Left  strength increased to 29#. FOTO score 56; improved by 1 point.  Pt reporting that she does not walk her dog anymore 2/2 pain in L hand from leash when switching- encouraged pt to give it another try since she has been feeling better. Pt also reporting that she has been placing dishes in the oven, but needs to use 2 hands. Pt stated that she tried a lighter pan with french fries, but was unable to place in/out with her L hand 2/2 pain. Patient will continue to benefit from skilled OT services to address strength deficits, analyze and cue movement patterns and instruct in home and community integration to attain remaining goals. [x]  See Plan of Care 
[]  See progress note/recertification 
[]  See Discharge Summary Progress towards goals / Updated goals: 1. Ind HEP to maximize rehab potential  
Current: Reports completing HEP daily 
  
2. Full active fist to hold coins Current: [place] manipulation activity 
  3. Ind washing dishes: 
Current: States able to wash cups in sink 
  4. Ind cooking- placing dishes in oven Current: Uses both hands 
  5. Decrease pain 1/10 with ADLs: 
Current: 3/10  
  
6. Increase left  5-10# for IADLs Current: increased from 12# to 29# with Min pain PLAN 
[]  Upgrade activities as tolerated     [x]  Continue plan of care 
[]  Update interventions per flow sheet      
[]  Discharge due to:_ 
[]  Other:_   
 
CHRISTINE Melchor/MICHAEL  11/27/2018  1:02 PM 
 
Future Appointments Date Time Provider Janusz Yen 11/29/2018  2:00 PM [facility] Keyla Willis OT  [place] [address] Keyla Willis

## 2018-11-29 ENCOUNTER — HOSPITAL ENCOUNTER (OUTPATIENT)
Dept: PHYSICAL THERAPY | Age: 38
Discharge: HOME | End: 2018-11-29
Payer: MEDICAID

## 2018-11-29 PROCEDURE — 97530 THERAPEUTIC ACTIVITIES: CPT

## 2018-11-29 NOTE — PROGRESS NOTES
OT DAILY TREATMENT NOTE  3-16 Patient Name: Dory Macias Date:2018 : 1980 [x]  Patient  Verified Payor: Gloriabrendon Deya / Plan: [id number] [employer] [place] / Product Type: Managed Care Medicaid / In time: 2:00  Out time: 2:25 Total Treatment Time (min): 25 Visit #: 6 of 8 Treatment Area: Pain in left hand [M79.642] SUBJECTIVE Pain Level (0-10 scale): 2/10 Any medication changes, allergies to medications, adverse drug reactions, diagnosis change, or new procedure performed?: [x] No    [] Yes (see summary sheet for update) Subjective functional status/changes:   [x] No changes reported \"It feels a lot better\" OBJECTIVE: 
 
25 min Therapeutic Activity:  [x]  See flow sheet :  
Rationale: increase ROM, increase strength and improve coordination  to improve the patients ability to Perform ADL's with greater ease and less pain. With 
 [] TE 
 [] TA 
 [] neuro 
 [] other: Patient Education: [x] Review HEP [] Progressed/Changed HEP based on:  
[] positioning   [] body mechanics   [] transfers   [] heat/ice application  
[] Splint wear/care   [] Sensory re-education   [] scar management     
[] other: Use L hand for all tasks Pain Level (0-10 scale) post treatment:   2/10 ASSESSMENT/Changes in Function:  
Green graded clothespin pinching with thumb and middle finger x 20 reps with visual demo to  beads. Pac man resistance #2, pinching ball with thumb and middle finger x 20 reps picking up beads. Red digi-flex x 20 reps with 1 rest break. Yellow putty x 20 reps for gross  and pinch. [place] sorting with 3 handfuls with minimal spillage from left fist. Encouraged pt to use left hand at home while completing ADL's and IADL's such as washing dishes and placing pans in/out of oven-suggested lighter pan to trial first, then to gradually try dishes that are heavier. Patient reports that she is still taking pain medication, but not very often. Patient will continue to benefit from skilled OT services to address ROM deficits, address strength deficits and instruct in home and community integration to attain remaining goals. []  See Plan of Care [x]  See progress note/recertification 
[]  See Discharge Summary Progress towards goals / Updated goals: 1. Ind HEP to maximize rehab potential  
Current: Reports completing HEP daily 
  
2. Full active fist to hold coins Current: [place] manipulation activity- minimal spillage 
  3. Ind washing dishes: 
Current: States able to wash cups in sink 
  4. Ind cooking- placing dishes in oven Current: Uses both hands 
  5. Decrease pain 1/10 with ADLs: 
Current: 2/10  
  
6. Increase left  5-10# for IADLs Current: increased from 12# to 29# with Min pain PLAN 
[]  Upgrade activities as tolerated     [x]  Continue plan of care 
[]  Update interventions per flow sheet      
[]  Discharge due to:_ 
[]  Other:_   
 
ELIZABETH Blake  11/29/2018  2:01 PM 
 
Future Appointments Date Time Provider Janusz Yen 12/4/2018 11:00 AM [facility] OT AT [facility]  
12/5/2018  4:00 PM [facility] OT AT [address] [place] [address] [facility]

## 2018-12-04 ENCOUNTER — APPOINTMENT (OUTPATIENT)
Dept: PHYSICAL THERAPY | Age: 38
End: 2018-12-04
Payer: MEDICAID

## 2018-12-05 ENCOUNTER — HOSPITAL ENCOUNTER (OUTPATIENT)
Dept: PHYSICAL THERAPY | Age: 38
Discharge: HOME | End: 2018-12-05
Payer: MEDICAID

## 2018-12-05 PROCEDURE — 97110 THERAPEUTIC EXERCISES: CPT

## 2018-12-05 NOTE — PROGRESS NOTES
[facility] at [facility] 
[address] Alexy Kwan, [address] [phone]  Fx [phone] Occupational Therapy Progress Note, DC summary Patient name: René Garland Start of Care: 10/31/18 Referral source: Vaishali Delgado MD : 1980 Medical/Treatment Diagnosis: Pain in left hand [M79.642] Onset Date:9/3/18 Prior Hospitalization: see medical history Provider#: [id number] Medications: Verified on Patient Summary List   
Comorbidities: NA 
Prior Level of Function:Independent without limitations in ADL and IADL activities. Visits from Start of Care:7    Missed Visits: 0 Established Goals:         Excellent           Good         Limited           None 
[x] Increased ROM   [x]  []  []  [] 
[x] Increased Strength  [x]  []  []  [] 
[] Increased Mobility  []  []  []  []  
[x] Decreased Pain   [x]  []  []  [] 
[] Decreased Swelling  []  []  []  [] 
[x] Increased Fine Motor Skills [x]  []  []  [] 
[] Increased ADL [place] []  []  []  [] 
 
Key Functional Changes:  Measurements: Taken with [employer]q Dynamometer, in Lbs Level 2 18 Date Date Date Date Date Date Right 48.1, 41.5, 48.3 Left 28.8, 31.1, 32.6 Deficit Change Pinch Measurements: Taken with Pinch Gauge, in Lbs (hand) 18 Date Date Date Date Date Lateral         
Right 12 Left  14 Deficit Change Pad Right 14 Left 10 Deficit Change [employer] Right 13 Left 12 Deficit Change Single Digit ROM CHART as measured in degrees Digit A/P 18 Date Side MP 85 PIP 90 DIP 80    
COON 255 ASSESSMENT/RECOMMENDATIONS: 
[]Continue therapy per initial plan/protocol at a frequency of   x per week for   weeks []Continue therapy with the following recommended changes:_____________________ _____________________________________________________________________ [x]Discontinue therapy progressing towards or have reached established goals []Discontinue therapy due to lack of appreciable progress towards goals []Discontinue therapy due to lack of attendance or compliance []Await Physician's recommendations/decisions regarding therapy []Other:________________________________________________________________ Thank you for this referral.  
Panchito Aparicio OT 12/5/2018 4:12 PM 
NOTE TO PHYSICIAN:  PLEASE COMPLETE THE ORDERS BELOW AND  
FAX TO [facility]: [phone] If you are unable to process this request in 24 hours please contact our office: [phone] ? I have read the above report and request that my patient continue as recommended. ? I have read the above report and request that my patient continue therapy with the following changes/special instructions:________________________________________________________ ? I have read the above report and request that my patient be discharged from therapy.  
 
[de-identified] Signature:____________Date:_________TIME:________ 
 
[employer], Date and Time must be completed for valid certification **

## 2018-12-05 NOTE — PROGRESS NOTES
In [address] [place], [address] 
[phone] fax Patient Name: Farida Stephenson Date:2018 : 1980 [x]  Patient  Verified Hand Therapy/ Occupational Therapy Discharge Instructions Continue with home exercise program for 1-2 times a day for 3 weeks then decrease to  as needed/patient discretion. Follow up with MD: increase in pain and increased limitations Recommendations: ____x Return to activity with home program 
          ____ Return to activity with the following modifcations 
                     ____ Post Rehab Program 
                                ____Return to/Join local gym Additional comments: 
 
 
 
 
Please call with any questions or concerns. Thank you for your participation.   
 
 
 
Torie Rolon OT 2018  4:37 PM

## 2018-12-05 NOTE — PROGRESS NOTES
OT DAILY TREATMENT NOTE  3-16 Patient Name: Diaz Modi Date:2018 : 1980 [x]  Patient  Verified Payor: Iraida Campos / Plan: [employer] / Product Type: Managed Care Medicaid / In time:1609  Out UFAU:[id number] Total Treatment Time (min): 35 Visit #:  Treatment Area: Pain in left hand [M79.382] SUBJECTIVE Pain Level (0-10 scale): 0 Any medication changes, allergies to medications, adverse drug reactions, diagnosis change, or new procedure performed?: [x] No    [] Yes (see summary sheet for update) Subjective functional status/changes:   [x] No changes reported OBJECTIVE 35 min Therapeutic Exercise:  [x] See flow sheet :  
Rationale: increase strength to improve the patients ability to return to normal functional use of the left UE with ADL tasks With 
 [] TE 
 [] TA 
 [] neuro 
 [] other: Patient Education: [x] Review HEP [] Progressed/Changed HEP based on:  
[] positioning   [] body mechanics   [] transfers   [] heat/ice application  
[] Splint wear/care   [] Sensory re-education   [] scar management     
[] other:   
 
      
Other Objective/Functional Measures:  Measurements: Taken with [employer] Dynamometer, in Lbs Level 2 18 Date Date Date Date Date Date Right 48.1, 41.5, 48.3              
Left 28.8, 31.1, 32.6              
Deficit                
Change                
    
 Pinch Measurements: Taken with Pinch Gauge, in Lbs (hand) 18 Date Date Date Date Date Lateral               
Right 12            
Left  14            
Deficit              
Change              
Pad              
Right 14            
Left 10            
Deficit              
Change              
Timothy              
Right 13            
Left 12            
Deficit              
Change              
  
Single Digit ROM CHART as measured in degrees Digit A/P 18 Date Side    
MP 85      
PIP 90      
DIP 80      
COON 255      
  
  Pain Level (0-10 scale) post treatment:0 
 
ASSESSMENT/Changes in Function: see DC note 
 
[]  See progress note/recertification 
[x]  See Discharge Summary Progress towards goals / Updated goals: 1. Ind HEP to maximize rehab potential  
Current: Reports completing HEP daily, Goal met 
  
2. Full active fist to hold coins Current: [place] manipulation activity, Goal met 
  3. Ind washing dishes: 
Current: States able to wash cups in sink, goal met 
  4. Ind cooking- placing dishes in oven Current: Uses both hands, Goal met 
  
5. Decrease pain 1/10 with ADLs: 
Current: 3/10 , O/10 Goal met 
  
6. Increase left  5-10# for IADLs Current: increased from 12# to 29# with Min pain , Goal met PLAN 
[]  Upgrade activities as tolerated     []  Continue plan of care 
[]  Update interventions per flow sheet [x]  Discharge due to:_maximum benefit met 
[]  Other:_ Maria R Enrique OT 12/5/2018  4:53 PM 
 
No future appointments.

## 2022-03-19 PROBLEM — G40.A09 ABSENCE SEIZURE DISORDER (HCC): Status: ACTIVE | Noted: 2017-07-12

## 2022-03-19 PROBLEM — S09.90XA ACUTE HEAD TRAUMA: Status: ACTIVE | Noted: 2017-07-12

## 2022-04-05 ENCOUNTER — HOSPITAL ENCOUNTER (OUTPATIENT)
Dept: LAB | Age: 42
Discharge: HOME | End: 2022-04-05

## 2022-04-05 PROCEDURE — 88305 TISSUE EXAM BY PATHOLOGIST: CPT
